# Patient Record
(demographics unavailable — no encounter records)

---

## 2025-03-10 NOTE — HEALTH RISK ASSESSMENT
[No] : No [No falls in past year] : Patient reported no falls in the past year [de-identified] : heme onc [Audit-CScore] : 0

## 2025-03-10 NOTE — CURRENT MEDS
[None] : Patient does not have any barriers to medication adherence [Takes medication as prescribed] : does not take [Yes] : Reviewed medication list for presence of high-risk medications. [Other____] : [unfilled]
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
## 2025-03-10 NOTE — ASSESSMENT
[FreeTextEntry1] :    dcis, myeloid leukemia blanca fu with heme atopic dermatitis lotrisone cream bid 45 g  screening lipid Labs drawn/ specimens obtained  in office on this date of service  for evaluation of   assessed conditions -cbc cmp lipid      to be run at [facility]. screening colon ca colonoscopy screening breast ca mammogram   immunization consider shingrix

## 2025-06-02 NOTE — PLAN
[FreeTextEntry1] : dizziness with middle ear effusion noted on examination trial of prednisone taper with fluticasone nasal spray meclizine for current dizziness symptoms not cardiac in nature RTO 1 week if symptoms worsen

## 2025-06-02 NOTE — HISTORY OF PRESENT ILLNESS
[FreeTextEntry8] : Patient presents with severe vertigo since yesterday   Presenting in office with complaints of significant dizziness, describes as a feeling, triggered and worsened by movement, improved by standing still. She was recently sick with cough and runny nose. She denies any chest pain, palpitations, SOB. She was recently sick with URI.   of note this has been happening to her every year around the same time of year- spring time,

## 2025-06-02 NOTE — HEALTH RISK ASSESSMENT
[0] : 2) Feeling down, depressed, or hopeless: Not at all (0) [PHQ-2 Negative - No further assessment needed] : PHQ-2 Negative - No further assessment needed [Never] : Never [DHC2Siaoj] : 0